# Patient Record
(demographics unavailable — no encounter records)

---

## 2024-12-02 NOTE — ASSESSMENT
[FreeTextEntry1] : 59M, [employer] [place] w/ uncontrolled T2DM cb CAD s/p 3DES, hx of pancreatitis (unknown etiology), HLD, HTN rtc in 1month with nilson to possibly start carb counting rtc in 3months with jose with labs rtc in 8months with me labs pending  T2DM cb CAD - no data a1c goal 7 low cpeptide with high glucose, likely needs prandial insulin --> recheck cpeptide needs to see MUST rotate sites has Stacy continue metformin 2000mg daily continue humalog 50/50 at 28 units before breakfast and 30 units before dinner steglatro no covered, supposed jardiance 25mg daily covered has some needle phobia. cequr with basal insulin is a good option --> patient never received this would eventually go with automated pump but will see how patient does with carb counting first would not start on actos (father with bladder cancer) cannot take glp1 or dpp4 bc of pancreatitis hx, will try to get some records  Hypogonadism- 2024 fsh/lh both normal. testosterone normal. monitor. symptoms likely related to uncontrolled diabetes  Hypercalcemia- mild at 10.3 (upper limit 10.2). 2024 pth low with normal calcium. monitor  HLD- goal ldl <70. check lipids, on statin and fenofibrate  HTN- BP acceptable. continue ACEi  ---------- This patient with diabetes - Injects insulin 1+ times daily - Is currently on CGM, testing glucose continuously - Has been seen in the office by a provider within the last six months to review CGM data with their provider CGM is medically necessary for this pt. - CGM will improve/maintain A1c - CGM will reduce hypoglycemic events Stacy and Dexcom each require one test strip daily to use in case of sensor failure or for blood glucose verification or during sensor warmup.

## 2024-12-02 NOTE — ASSESSMENT
[FreeTextEntry1] : 59M, [employer] [place] w/ uncontrolled T2DM cb CAD s/p 3DES, hx of pancreatitis (unknown etiology), HLD, HTN rtc in 1month with nilson to possibly start carb counting rtc in 3months with jose with labs rtc in 8months with me labs pending  T2DM cb CAD - no data a1c goal 7 low cpeptide with high glucose, likely needs prandial insulin --> recheck cpeptide needs to see MUST rotate sites has Stacy continue metformin 2000mg daily continue humalog 50/50 at 28 units before breakfast and 30 units before dinner steglatro no covered, supposed jardiance 25mg daily covered has some needle phobia. cequr with basal insulin is a good option --> patient never received this would eventually go with automated pump but will see how patient does with carb counting first would not start on actos (father with bladder cancer) cannot take glp1 or dpp4 bc of pancreatitis hx, will try to get some records  Hypogonadism- 2024 fsh/lh both normal. testosterone normal. monitor. symptoms likely related to uncontrolled diabetes  Hypercalcemia- mild at 10.3 (upper limit 10.2). 2024 pth low with normal calcium. monitor  HLD- goal ldl <70. check lipids, on statin and fenofibrate  HTN- BP acceptable. continue ACEi  ---------- This patient with diabetes - Injects insulin 1+ times daily - Is currently on CGM, testing glucose continuously - Has been seen in the office by a provider within the last six months to review CGM data with their provider CGM is medically necessary for this pt. - CGM will improve/maintain A1c - CGM will reduce hypoglycemic events Stacy and Dexcom each require one test strip daily to use in case of sensor failure or for blood glucose verification or during sensor warmup.

## 2024-12-02 NOTE — HISTORY OF PRESENT ILLNESS
[FreeTextEntry1] : here for T2DM  type 2 diabetes since 2016 diagnosed by: routine current severity: uncontrolled FH of diabetes: grandmother  complications: CAD s/p 3 DES 2017   notable events: back surgery 3/2023 Dr. Adams, father at 85yo with bladder cancer, passed 7/4/23 has 4 kids, adopted 2 of nephews (9yo boy)   interval history last seen by me 1/2024 chart reviewed no recent labs ran out of sglt2 due to insurance change  has been getting steroid injections in his back just told me today that he had some type of pancreatitis last year at [facility]. but not sure if it was pancreatitis.    regimen  humalog 50/50 at 28-30 units before breakfast and before dinner steglatro 15mg daily- ran out for last few days metformin ER 500mg 2 tabs BID hx of pancreatitis 2015, possibly from ?januvia and unknown  in office FS: 163 no junior, ran out   diet: eats lunch at [email or web address]. gma quits at the [email or web address]  exercise: works construction weight: relatively stable  eye doctor: 2023, neg  neuropathy: none CKD: none other (PVD/MI/CVA): CAD s/p 3DES 2017 former smoker

## 2024-12-02 NOTE — HISTORY OF PRESENT ILLNESS
[FreeTextEntry1] : here for T2DM  type 2 diabetes since 2016 diagnosed by: routine current severity: uncontrolled FH of diabetes: grandmother  complications: CAD s/p 3 DES 2017   notable events: back surgery 3/2023 Dr. Adams, father at 83yo with bladder cancer, passed 7/4/23 has 4 kids, adopted 2 of nephews (7yo boy)   interval history last seen by me 1/2024 chart reviewed no recent labs ran out of sglt2 due to insurance change  has been getting steroid injections in his back just told me today that he had some type of pancreatitis last year at [facility]. but not sure if it was pancreatitis.    regimen  humalog 50/50 at 28-30 units before breakfast and before dinner steglatro 15mg daily- ran out for last few days metformin ER 500mg 2 tabs BID hx of pancreatitis 2015, possibly from ?januvia and unknown  in office FS: 163 no junior, ran out   diet: eats lunch at [email or web address]. gma quits at the [email or web address]  exercise: works construction weight: relatively stable  eye doctor: 2023, neg  neuropathy: none CKD: none other (PVD/MI/CVA): CAD s/p 3DES 2017 former smoker

## 2025-03-21 NOTE — ASSESSMENT
[FreeTextEntry1] : 58 year old male, [employer] , presents today for follow-up of T2DM with associated CAD s/p 3 SAMY, hyperlipidemia, and hypertension. He has a history of pancreatitis of unknown etiology but potentially secondary to Januvia.   1. T2DM- CGMS with avg , 53% of values in target range and the rest high. He is having post prandial hyperglycemia, most pronounced with breakfast and dinner. A1c 8.1% January 2025, improving. -Contiue Basaglar, Jardiance, and Metformin. -Continue with CeQur, but increase by one click (2 units) before breakfast and dinner -Needs to improve diet. Admits to frequent diet nonadherence. -Reviewed risks/complications of uncontrolled diabetes/hyperglycemia. -Needs diabetic eye exam! -PETER normal January 2025. -No GLP1 agonist or DPP-IV inhibitor d/t history of pancreatitis. -No Actos d/t FH of bladder cancer in father. -Repeat A1c and RTO for follow-up with MD in 3 months.  2. Hypogonadism- testosterone improving with better glycemic control. FSH/LH normal in 2024. -Will monitor. Repeat testosterone with next labs.  3. Hypercalcemia- mild in the past. -Repeat calcium with PTH and vitamin D with next labs.  4. Hyperlipidemia- LDL at target. -Continue statin and fenofibrate.  5. Hypertension- controlled. -Continue ACE inhibitor.

## 2025-03-21 NOTE — HISTORY OF PRESENT ILLNESS
[FreeTextEntry1] : Type: 2 Diabetes since:  Number of years on insulin: none Diagnosed by: routine Current severity: uncontrolled FH of diabetes: grandmother Complications: CAD s/p 3 SAMY 2017  INTERVAL HISTORY: Had back surgery 3/2023 Dr. Adams, feels a lot better, no pain. Had excellent glycemic control prior to surgery based on CGMS, reports A1c was 6.5% at that time. Now admits to more cheating with diet, A1c up to 7.8%. Father at 85 y/o with bladder cancer, passed  Has 4 kids, adopted 2 of his nephews Brother  wife No longer getting steroids injections for back pain. Taking gabapentin which seems to be helping.   Current regimen: CeQur 10--8--12 Basaglar 32 units HS Jardiance 25 mg daily Metformin  mg 2 tabs BID  Past meds: Hx of pancreatitis , possibly from uvia and unknown  Current B, English muffin for breakfast with chcken salad. SMBG with Dexcom G7. Reviewed CGMS. Avg  CV 24.9% Very high 10% High 37% Target 53% Low 0% Very low 0%  Interpretation: post prandial hyperglycemia  Diet: has been cheating with his diet and just taking extra insulin Exercise: physical job, works in construction Weight: gained 10 pounds  Eye doctor: , neg  Neuropathy: none CKD: none Other (PVD/MI/CVA): CAD s/p 2017 Former smoker